# Patient Record
Sex: MALE | Race: OTHER | HISPANIC OR LATINO | Employment: FULL TIME | URBAN - METROPOLITAN AREA
[De-identification: names, ages, dates, MRNs, and addresses within clinical notes are randomized per-mention and may not be internally consistent; named-entity substitution may affect disease eponyms.]

---

## 2022-09-20 ENCOUNTER — HOSPITAL ENCOUNTER (EMERGENCY)
Facility: MEDICAL CENTER | Age: 47
End: 2022-09-20
Attending: EMERGENCY MEDICINE

## 2022-09-20 ENCOUNTER — APPOINTMENT (OUTPATIENT)
Dept: RADIOLOGY | Facility: MEDICAL CENTER | Age: 47
End: 2022-09-20
Attending: EMERGENCY MEDICINE

## 2022-09-20 VITALS
TEMPERATURE: 98 F | DIASTOLIC BLOOD PRESSURE: 84 MMHG | RESPIRATION RATE: 18 BRPM | OXYGEN SATURATION: 98 % | HEART RATE: 80 BPM | WEIGHT: 194 LBS | SYSTOLIC BLOOD PRESSURE: 143 MMHG | HEIGHT: 66 IN | BODY MASS INDEX: 31.18 KG/M2

## 2022-09-20 DIAGNOSIS — M25.511 ACUTE PAIN OF RIGHT SHOULDER: ICD-10-CM
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
## 2022-09-20 PROCEDURE — A9270 NON-COVERED ITEM OR SERVICE: HCPCS | Performed by: EMERGENCY MEDICINE

## 2022-09-20 PROCEDURE — 700102 HCHG RX REV CODE 250 W/ 637 OVERRIDE(OP): Performed by: EMERGENCY MEDICINE

## 2022-09-20 PROCEDURE — 99284 EMERGENCY DEPT VISIT MOD MDM: CPT

## 2022-09-20 PROCEDURE — 73030 X-RAY EXAM OF SHOULDER: CPT | Mod: RT

## 2022-09-20 PROCEDURE — 71046 X-RAY EXAM CHEST 2 VIEWS: CPT

## 2022-09-20 RX ORDER — HYDROCODONE BITARTRATE AND ACETAMINOPHEN 5; 325 MG/1; MG/1
1 TABLET ORAL ONCE
Status: COMPLETED | OUTPATIENT
Start: 2022-09-20 | End: 2022-09-20

## 2022-09-20 RX ADMIN — HYDROCODONE BITARTRATE AND ACETAMINOPHEN 1 TABLET: 5; 325 TABLET ORAL at 15:10

## 2022-09-20 ASSESSMENT — PAIN SCALES - WONG BAKER: WONGBAKER_NUMERICALRESPONSE: HURTS EVEN MORE

## 2022-09-20 ASSESSMENT — ENCOUNTER SYMPTOMS
VOMITING: 0
HEADACHES: 0
LOSS OF CONSCIOUSNESS: 0
BACK PAIN: 0
NAUSEA: 0
NECK PAIN: 0

## 2022-09-20 NOTE — DISCHARGE INSTRUCTIONS
Rest, return for worsening pain, for any swelling, any numbness, tingling weakness, shortness of breath or other concerns.  Follow-up with primary care or orthopedics if you have persistent pain.  Take over-the-counter Profen or Tylenol as directed for pain.
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
## 2022-09-20 NOTE — ED NOTES
Discharge instructions reviewed with pt. Pt voices understanding of follow up with PCP and return precautions.  at bedside throughout discharge.

## 2022-09-20 NOTE — ED NOTES
Pt able to ambulate to TCS 03 from the lobby with steady gait.     Frisian speaking only, pt reports pain to the R shoulder that is worse with palpation/ movement.       Chart up for ERP.  at bedside.